# Patient Record
Sex: FEMALE | Race: WHITE | URBAN - METROPOLITAN AREA
[De-identification: names, ages, dates, MRNs, and addresses within clinical notes are randomized per-mention and may not be internally consistent; named-entity substitution may affect disease eponyms.]

---

## 2019-05-23 RX ORDER — LOSARTAN POTASSIUM 25 MG/1
TABLET ORAL
Qty: 30 TABLET | Refills: 0 | OUTPATIENT
Start: 2019-05-23

## 2019-05-28 RX ORDER — LOSARTAN POTASSIUM 25 MG/1
TABLET ORAL
Qty: 90 TABLET | Refills: 1 | Status: SHIPPED | OUTPATIENT
Start: 2019-05-28 | End: 2019-08-20 | Stop reason: SDUPTHER

## 2019-05-28 RX ORDER — LOSARTAN POTASSIUM 25 MG/1
TABLET ORAL
Refills: 0 | COMMUNITY
Start: 2019-04-24 | End: 2019-05-28 | Stop reason: SDUPTHER

## 2019-07-23 RX ORDER — GABAPENTIN 100 MG/1
100 CAPSULE ORAL 2 TIMES DAILY
Qty: 60 CAPSULE | Refills: 2 | Status: SHIPPED | OUTPATIENT
Start: 2019-07-23 | End: 2019-08-20 | Stop reason: SDUPTHER

## 2019-08-20 ENCOUNTER — HOSPITAL ENCOUNTER (OUTPATIENT)
Age: 64
Discharge: HOME | End: 2019-08-22
Payer: COMMERCIAL

## 2019-08-20 ENCOUNTER — OFFICE VISIT (OUTPATIENT)
Dept: PRIMARY CARE CLINIC | Age: 64
End: 2019-08-20
Payer: COMMERCIAL

## 2019-08-20 VITALS
DIASTOLIC BLOOD PRESSURE: 78 MMHG | TEMPERATURE: 97.7 F | OXYGEN SATURATION: 96 % | HEART RATE: 80 BPM | BODY MASS INDEX: 38.71 KG/M2 | SYSTOLIC BLOOD PRESSURE: 136 MMHG | HEIGHT: 61 IN | WEIGHT: 205 LBS

## 2019-08-20 DIAGNOSIS — E55.9 VITAMIN D DEFICIENCY: ICD-10-CM

## 2019-08-20 DIAGNOSIS — C50.919 MALIGNANT NEOPLASM OF FEMALE BREAST, UNSPECIFIED ESTROGEN RECEPTOR STATUS, UNSPECIFIED LATERALITY, UNSPECIFIED SITE OF BREAST (HCC): ICD-10-CM

## 2019-08-20 DIAGNOSIS — E78.2 MIXED HYPERLIPIDEMIA: ICD-10-CM

## 2019-08-20 DIAGNOSIS — I10 ESSENTIAL HYPERTENSION: ICD-10-CM

## 2019-08-20 DIAGNOSIS — R73.01 IMPAIRED FASTING GLUCOSE: ICD-10-CM

## 2019-08-20 DIAGNOSIS — K21.9 GASTROESOPHAGEAL REFLUX DISEASE, ESOPHAGITIS PRESENCE NOT SPECIFIED: ICD-10-CM

## 2019-08-20 DIAGNOSIS — Z12.4 SCREENING FOR CERVICAL CANCER: ICD-10-CM

## 2019-08-20 DIAGNOSIS — M48.061 SPINAL STENOSIS OF LUMBAR REGION, UNSPECIFIED WHETHER NEUROGENIC CLAUDICATION PRESENT: ICD-10-CM

## 2019-08-20 DIAGNOSIS — L30.9 DERMATITIS: ICD-10-CM

## 2019-08-20 DIAGNOSIS — E66.01 CLASS 2 SEVERE OBESITY DUE TO EXCESS CALORIES WITH SERIOUS COMORBIDITY AND BODY MASS INDEX (BMI) OF 39.0 TO 39.9 IN ADULT (HCC): Primary | ICD-10-CM

## 2019-08-20 DIAGNOSIS — E03.8 OTHER SPECIFIED HYPOTHYROIDISM: ICD-10-CM

## 2019-08-20 DIAGNOSIS — M85.80 OSTEOPENIA, UNSPECIFIED LOCATION: ICD-10-CM

## 2019-08-20 PROBLEM — E66.812 CLASS 2 SEVERE OBESITY DUE TO EXCESS CALORIES WITH SERIOUS COMORBIDITY AND BODY MASS INDEX (BMI) OF 39.0 TO 39.9 IN ADULT (HCC): Status: ACTIVE | Noted: 2019-08-20

## 2019-08-20 PROCEDURE — 99214 OFFICE O/P EST MOD 30 MIN: CPT | Performed by: FAMILY MEDICINE

## 2019-08-20 PROCEDURE — G0123 SCREEN CERV/VAG THIN LAYER: HCPCS

## 2019-08-20 RX ORDER — METOPROLOL SUCCINATE 50 MG/1
50 TABLET, EXTENDED RELEASE ORAL DAILY
Qty: 90 TABLET | Refills: 1 | Status: SHIPPED
Start: 2019-08-20 | End: 2020-02-25 | Stop reason: SDUPTHER

## 2019-08-20 RX ORDER — LEVOTHYROXINE SODIUM 0.03 MG/1
25 TABLET ORAL DAILY
Qty: 90 TABLET | Refills: 1 | Status: SHIPPED | OUTPATIENT
Start: 2019-08-20 | End: 2019-11-01

## 2019-08-20 RX ORDER — CLOBETASOL PROPIONATE 0.5 MG/G
OINTMENT TOPICAL
Qty: 45 G | Refills: 2 | Status: SHIPPED | OUTPATIENT
Start: 2019-08-20

## 2019-08-20 RX ORDER — GABAPENTIN 100 MG/1
100 CAPSULE ORAL 2 TIMES DAILY
Qty: 90 CAPSULE | Refills: 1 | Status: SHIPPED | OUTPATIENT
Start: 2019-08-20 | End: 2020-01-31 | Stop reason: SDUPTHER

## 2019-08-20 RX ORDER — OMEPRAZOLE 20 MG/1
20 CAPSULE, DELAYED RELEASE ORAL DAILY
Qty: 90 CAPSULE | Refills: 1 | Status: SHIPPED
Start: 2019-08-20 | End: 2020-02-25 | Stop reason: SDUPTHER

## 2019-08-20 RX ORDER — LOSARTAN POTASSIUM 25 MG/1
TABLET ORAL
Qty: 90 TABLET | Refills: 1 | Status: SHIPPED | OUTPATIENT
Start: 2019-08-20

## 2019-08-20 RX ORDER — LEVOTHYROXINE SODIUM 0.03 MG/1
25 TABLET ORAL
COMMUNITY
Start: 2016-08-30 | End: 2019-08-20 | Stop reason: SDUPTHER

## 2019-08-20 ASSESSMENT — ENCOUNTER SYMPTOMS
SORE THROAT: 0
ABDOMINAL PAIN: 0
EYE REDNESS: 0
NAUSEA: 0
CONSTIPATION: 0
COLOR CHANGE: 0
SINUS PAIN: 0
CHEST TIGHTNESS: 0
ABDOMINAL DISTENTION: 0
PHOTOPHOBIA: 0
DIARRHEA: 0
BLOOD IN STOOL: 0
EYE ITCHING: 0
COUGH: 0
WHEEZING: 0
VOMITING: 0
SHORTNESS OF BREATH: 0
SINUS PRESSURE: 0
EYE PAIN: 0
BACK PAIN: 1

## 2019-08-20 ASSESSMENT — PATIENT HEALTH QUESTIONNAIRE - PHQ9
SUM OF ALL RESPONSES TO PHQ9 QUESTIONS 1 & 2: 0
SUM OF ALL RESPONSES TO PHQ QUESTIONS 1-9: 0
SUM OF ALL RESPONSES TO PHQ QUESTIONS 1-9: 0
1. LITTLE INTEREST OR PLEASURE IN DOING THINGS: 0
2. FEELING DOWN, DEPRESSED OR HOPELESS: 0

## 2019-09-19 PROBLEM — Z12.4 SCREENING FOR CERVICAL CANCER: Status: RESOLVED | Noted: 2019-08-20 | Resolved: 2019-09-19

## 2019-10-31 ENCOUNTER — HOSPITAL ENCOUNTER (OUTPATIENT)
Age: 64
Discharge: HOME | End: 2019-11-02
Payer: COMMERCIAL

## 2019-10-31 DIAGNOSIS — E78.2 MIXED HYPERLIPIDEMIA: ICD-10-CM

## 2019-10-31 DIAGNOSIS — R73.01 IMPAIRED FASTING GLUCOSE: ICD-10-CM

## 2019-10-31 DIAGNOSIS — E55.9 VITAMIN D DEFICIENCY: ICD-10-CM

## 2019-10-31 DIAGNOSIS — I10 ESSENTIAL HYPERTENSION: ICD-10-CM

## 2019-10-31 DIAGNOSIS — E03.8 OTHER SPECIFIED HYPOTHYROIDISM: ICD-10-CM

## 2019-10-31 LAB
ALBUMIN SERPL-MCNC: 4.5 G/DL (ref 3.5–5.2)
ALP BLD-CCNC: 89 U/L (ref 35–104)
ALT SERPL-CCNC: 21 U/L (ref 0–32)
ANION GAP SERPL CALCULATED.3IONS-SCNC: 15 MMOL/L (ref 7–16)
AST SERPL-CCNC: 24 U/L (ref 0–31)
BASOPHILS ABSOLUTE: 0.02 E9/L (ref 0–0.2)
BASOPHILS RELATIVE PERCENT: 0.5 % (ref 0–2)
BILIRUB SERPL-MCNC: 0.7 MG/DL (ref 0–1.2)
BILIRUBIN URINE: NEGATIVE
BLOOD, URINE: NEGATIVE
BUN BLDV-MCNC: 11 MG/DL (ref 8–23)
CALCIUM SERPL-MCNC: 9.3 MG/DL (ref 8.6–10.2)
CHLORIDE BLD-SCNC: 101 MMOL/L (ref 98–107)
CHOLESTEROL, TOTAL: 205 MG/DL (ref 0–199)
CLARITY: CLEAR
CO2: 25 MMOL/L (ref 22–29)
COLOR: YELLOW
CREAT SERPL-MCNC: 0.7 MG/DL (ref 0.5–1)
CREATININE URINE: 144 MG/DL (ref 29–226)
EOSINOPHILS ABSOLUTE: 0.03 E9/L (ref 0.05–0.5)
EOSINOPHILS RELATIVE PERCENT: 0.7 % (ref 0–6)
GFR AFRICAN AMERICAN: >60
GFR NON-AFRICAN AMERICAN: >60 ML/MIN/1.73
GLUCOSE BLD-MCNC: 108 MG/DL (ref 74–99)
GLUCOSE URINE: NEGATIVE MG/DL
HBA1C MFR BLD: 6.6 % (ref 4–5.6)
HCT VFR BLD CALC: 41.9 % (ref 34–48)
HDLC SERPL-MCNC: 56 MG/DL
HEMOGLOBIN: 12.8 G/DL (ref 11.5–15.5)
IMMATURE GRANULOCYTES #: 0.03 E9/L
IMMATURE GRANULOCYTES %: 0.7 % (ref 0–5)
KETONES, URINE: NEGATIVE MG/DL
LDL CHOLESTEROL CALCULATED: 133 MG/DL (ref 0–99)
LEUKOCYTE ESTERASE, URINE: NEGATIVE
LYMPHOCYTES ABSOLUTE: 1.46 E9/L (ref 1.5–4)
LYMPHOCYTES RELATIVE PERCENT: 35.4 % (ref 20–42)
MCH RBC QN AUTO: 29.5 PG (ref 26–35)
MCHC RBC AUTO-ENTMCNC: 30.5 % (ref 32–34.5)
MCV RBC AUTO: 96.5 FL (ref 80–99.9)
MICROALBUMIN UR-MCNC: 41.1 MG/L
MICROALBUMIN/CREAT UR-RTO: 28.5 (ref 0–30)
MONOCYTES ABSOLUTE: 0.52 E9/L (ref 0.1–0.95)
MONOCYTES RELATIVE PERCENT: 12.6 % (ref 2–12)
NEUTROPHILS ABSOLUTE: 2.07 E9/L (ref 1.8–7.3)
NEUTROPHILS RELATIVE PERCENT: 50.1 % (ref 43–80)
NITRITE, URINE: NEGATIVE
PDW BLD-RTO: 15.2 FL (ref 11.5–15)
PH UA: 7 (ref 5–9)
PLATELET # BLD: 230 E9/L (ref 130–450)
PMV BLD AUTO: 11.3 FL (ref 7–12)
POTASSIUM SERPL-SCNC: 4.1 MMOL/L (ref 3.5–5)
PROTEIN UA: NEGATIVE MG/DL
RBC # BLD: 4.34 E12/L (ref 3.5–5.5)
SODIUM BLD-SCNC: 141 MMOL/L (ref 132–146)
SPECIFIC GRAVITY UA: 1.01 (ref 1–1.03)
T4 FREE: 1.22 NG/DL (ref 0.93–1.7)
TOTAL PROTEIN: 8 G/DL (ref 6.4–8.3)
TRIGL SERPL-MCNC: 82 MG/DL (ref 0–149)
TSH SERPL DL<=0.05 MIU/L-ACNC: 4.58 UIU/ML (ref 0.27–4.2)
UROBILINOGEN, URINE: 0.2 E.U./DL
VITAMIN D 25-HYDROXY: 24 NG/ML (ref 30–100)
VLDLC SERPL CALC-MCNC: 16 MG/DL
WBC # BLD: 4.1 E9/L (ref 4.5–11.5)

## 2019-10-31 PROCEDURE — 85025 COMPLETE CBC W/AUTO DIFF WBC: CPT

## 2019-10-31 PROCEDURE — 82044 UR ALBUMIN SEMIQUANTITATIVE: CPT

## 2019-10-31 PROCEDURE — 80061 LIPID PANEL: CPT

## 2019-10-31 PROCEDURE — 81003 URINALYSIS AUTO W/O SCOPE: CPT

## 2019-10-31 PROCEDURE — 83036 HEMOGLOBIN GLYCOSYLATED A1C: CPT

## 2019-10-31 PROCEDURE — 84439 ASSAY OF FREE THYROXINE: CPT

## 2019-10-31 PROCEDURE — 36415 COLL VENOUS BLD VENIPUNCTURE: CPT

## 2019-10-31 PROCEDURE — 82306 VITAMIN D 25 HYDROXY: CPT

## 2019-10-31 PROCEDURE — 84443 ASSAY THYROID STIM HORMONE: CPT

## 2019-10-31 PROCEDURE — 80053 COMPREHEN METABOLIC PANEL: CPT

## 2019-10-31 PROCEDURE — 82570 ASSAY OF URINE CREATININE: CPT

## 2019-11-01 DIAGNOSIS — E03.8 OTHER SPECIFIED HYPOTHYROIDISM: ICD-10-CM

## 2019-11-01 RX ORDER — LEVOTHYROXINE SODIUM 0.05 MG/1
25 TABLET ORAL DAILY
Qty: 30 TABLET | Refills: 5 | Status: SHIPPED | OUTPATIENT
Start: 2019-11-01 | End: 2020-01-23 | Stop reason: SDUPTHER

## 2020-01-17 ENCOUNTER — HOSPITAL ENCOUNTER (OUTPATIENT)
Age: 65
Discharge: HOME | End: 2020-01-19
Payer: COMMERCIAL

## 2020-01-17 LAB
T4 FREE: 1.26 NG/DL (ref 0.93–1.7)
TSH SERPL DL<=0.05 MIU/L-ACNC: 3.29 UIU/ML (ref 0.27–4.2)

## 2020-01-17 PROCEDURE — 36415 COLL VENOUS BLD VENIPUNCTURE: CPT

## 2020-01-17 PROCEDURE — 84439 ASSAY OF FREE THYROXINE: CPT

## 2020-01-17 PROCEDURE — 84443 ASSAY THYROID STIM HORMONE: CPT

## 2020-01-23 ENCOUNTER — TELEPHONE (OUTPATIENT)
Dept: PRIMARY CARE CLINIC | Age: 65
End: 2020-01-23

## 2020-01-23 RX ORDER — LEVOTHYROXINE SODIUM 0.05 MG/1
50 TABLET ORAL DAILY
Qty: 30 TABLET | Refills: 5 | Status: SHIPPED
Start: 2020-01-23 | End: 2020-02-25 | Stop reason: SDUPTHER

## 2020-01-31 RX ORDER — GABAPENTIN 100 MG/1
100 CAPSULE ORAL 2 TIMES DAILY
Qty: 60 CAPSULE | Refills: 2 | Status: SHIPPED
Start: 2020-01-31 | End: 2020-02-25 | Stop reason: SDUPTHER

## 2020-01-31 NOTE — TELEPHONE ENCOUNTER
Last Appointment:  8/20/2019  Future Appointments   Date Time Provider Mandi Torres   2/25/2020  1:00 PM Tenisha Benton  [facility]

## 2020-02-25 ENCOUNTER — OFFICE VISIT (OUTPATIENT)
Dept: PRIMARY CARE CLINIC | Age: 65
End: 2020-02-25
Payer: COMMERCIAL

## 2020-02-25 VITALS
TEMPERATURE: 97 F | DIASTOLIC BLOOD PRESSURE: 82 MMHG | RESPIRATION RATE: 18 BRPM | HEIGHT: 61 IN | HEART RATE: 87 BPM | SYSTOLIC BLOOD PRESSURE: 126 MMHG | WEIGHT: 209 LBS | OXYGEN SATURATION: 96 % | BODY MASS INDEX: 39.46 KG/M2

## 2020-02-25 PROBLEM — M79.675 GREAT TOE PAIN, LEFT: Status: ACTIVE | Noted: 2020-02-25

## 2020-02-25 PROBLEM — E66.01 CLASS 3 SEVERE OBESITY DUE TO EXCESS CALORIES WITH SERIOUS COMORBIDITY AND BODY MASS INDEX (BMI) OF 40.0 TO 44.9 IN ADULT (HCC): Status: ACTIVE | Noted: 2020-02-25

## 2020-02-25 PROBLEM — E66.813 CLASS 3 SEVERE OBESITY DUE TO EXCESS CALORIES WITH SERIOUS COMORBIDITY AND BODY MASS INDEX (BMI) OF 40.0 TO 44.9 IN ADULT (HCC): Status: ACTIVE | Noted: 2020-02-25

## 2020-02-25 PROBLEM — Z12.31 ENCOUNTER FOR SCREENING MAMMOGRAM FOR BREAST CANCER: Status: ACTIVE | Noted: 2020-02-25

## 2020-02-25 PROCEDURE — 99214 OFFICE O/P EST MOD 30 MIN: CPT | Performed by: FAMILY MEDICINE

## 2020-02-25 RX ORDER — LEVOTHYROXINE SODIUM 0.05 MG/1
50 TABLET ORAL DAILY
Qty: 90 TABLET | Refills: 3 | Status: SHIPPED | OUTPATIENT
Start: 2020-02-25 | End: 2020-05-25

## 2020-02-25 RX ORDER — GABAPENTIN 100 MG/1
100 CAPSULE ORAL 2 TIMES DAILY
Qty: 180 CAPSULE | Refills: 1 | Status: SHIPPED | OUTPATIENT
Start: 2020-02-25 | End: 2020-05-25

## 2020-02-25 RX ORDER — METOPROLOL SUCCINATE 50 MG/1
50 TABLET, EXTENDED RELEASE ORAL DAILY
Qty: 90 TABLET | Refills: 3 | Status: SHIPPED | OUTPATIENT
Start: 2020-02-25

## 2020-02-25 RX ORDER — OMEPRAZOLE 20 MG/1
20 CAPSULE, DELAYED RELEASE ORAL DAILY
Qty: 90 CAPSULE | Refills: 3 | Status: SHIPPED | OUTPATIENT
Start: 2020-02-25

## 2020-02-25 ASSESSMENT — ENCOUNTER SYMPTOMS
EYE PAIN: 0
PHOTOPHOBIA: 0
BLOOD IN STOOL: 0
EYE ITCHING: 0
SINUS PRESSURE: 0
EYE REDNESS: 0
ABDOMINAL DISTENTION: 0
DIARRHEA: 0
VOMITING: 0
COLOR CHANGE: 0
CONSTIPATION: 0
SINUS PAIN: 0
ABDOMINAL PAIN: 0
SHORTNESS OF BREATH: 0
BACK PAIN: 1
SORE THROAT: 0
WHEEZING: 0
COUGH: 0
NAUSEA: 0
CHEST TIGHTNESS: 0

## 2020-02-25 ASSESSMENT — PATIENT HEALTH QUESTIONNAIRE - PHQ9
1. LITTLE INTEREST OR PLEASURE IN DOING THINGS: 0
SUM OF ALL RESPONSES TO PHQ9 QUESTIONS 1 & 2: 0
SUM OF ALL RESPONSES TO PHQ QUESTIONS 1-9: 0
2. FEELING DOWN, DEPRESSED OR HOPELESS: 0
SUM OF ALL RESPONSES TO PHQ QUESTIONS 1-9: 0

## 2020-02-25 NOTE — PROGRESS NOTES
[facility]) Physicians   Internal Medicine     2020  Radha Aguirre : 1955 Sex: female  Age:64 y.o. Chief Complaint   Patient presents with    Hypertension     6 month visit and med refill        Hypertension   Pertinent negatives include no chest pain, headaches, neck pain, palpitations or shortness of breath. Patient presents to office for check up. States hypothyroid. On levothyroxine. No reported side effects, reviewed thyroid bw from [address]12--jsafqjp normal  - States h/o htn--bp well controlled. States does not check blood pressure at home.   - States has GERD. On omeprazole. Stable. - States has low back pain and spinal stenosis. States intermittently takes gabapentin. Has seen  specialist whom suggested surgery - declined  - States h/o breast cancer. Dx was . States had mastectomy. States had chemo and radiation and 5  years of Arimidex. Following with oncology. Due for mammogram 2020  - States has impaired fasting sugar. States watching diet. discussed need to lose weight, recommended referral to dietician  - States has dry flaky skin chin  Reviewed bw results--thyroid bw wnl, was underactive at time of bw 10/2019--synthroid dose was adjusted. Reviewed dexa scan from 2019--osteopenia of hip --1.9, spine normal, recommend calcium 1000 mg per day and vit d3 1000 iu per day and weight bearing exercises. Patient with c/o left great toe pain for past month, denies known injury although does state her 2nd toe was bruised. Denies change in shoes  Review of Systems   Constitutional: Negative for appetite change, fatigue, fever and unexpected weight change. HENT: Negative for congestion, ear discharge, ear pain, hearing loss, mouth sores, postnasal drip, sinus pressure, sinus pain, sneezing and sore throat. Eyes: Negative for photophobia, pain, redness and itching. Respiratory: Negative for cough, chest tightness, shortness of breath and wheezing. file     Physically abused: Not on file     Forced sexual activity: Not on file   Other Topics Concern    Not on file   Social History Narrative    Not on file       Vitals:    02/25/20 1308   BP: 126/82   Pulse: 87   Resp: 18   Temp: 97 °F (36.1 °C)   TempSrc: Temporal   SpO2: 96%   Weight: 209 lb (94.8 kg)   Height: 5' 0.5\" (1.537 m)       Exam:  Physical Exam  Vitals signs reviewed. Constitutional:       Appearance: She is well-developed. Comments: Obesity noted   HENT:      Head: Normocephalic and atraumatic. Right Ear: External ear normal.      Left Ear: External ear normal.      Nose: Nose normal.   Eyes:      Conjunctiva/sclera: Conjunctivae normal.      Pupils: Pupils are equal, round, and reactive to light. Neck:      Musculoskeletal: Normal range of motion and neck supple. Cardiovascular:      Rate and Rhythm: Normal rate and regular rhythm. Heart sounds: Normal heart sounds. Pulmonary:      Effort: Pulmonary effort is normal.   Abdominal:      General: Bowel sounds are normal.      Palpations: Abdomen is soft. Musculoskeletal:      Comments: ttp over base of great left toe, no ttp over metatarsal bones   Skin:     General: Skin is warm and dry. Findings: No rash. Comments: Dry skin noted on chin   Neurological:      Mental Status: She is alert and oriented to person, place, and time.    Psychiatric:         Behavior: Behavior normal.         Assessment and Plan:    Problem List        Circulatory    Hypertension    Relevant Medications    losartan (COZAAR) 25 MG tablet    metoprolol succinate (TOPROL XL) 50 MG extended release tablet    Other Relevant Orders    CBC Auto Differential       Digestive    GERD (gastroesophageal reflux disease)    Relevant Medications    omeprazole (PRILOSEC) 20 MG delayed release capsule       Endocrine    Hypothyroidism    Relevant Medications    levothyroxine (SYNTHROID) 50 MCG tablet    Other Relevant Orders    CBC Auto Differential

## 2020-03-21 ENCOUNTER — TELEPHONE (OUTPATIENT)
Dept: PODIATRY | Age: 65
End: 2020-03-21

## 2020-03-26 PROBLEM — Z12.31 ENCOUNTER FOR SCREENING MAMMOGRAM FOR BREAST CANCER: Status: RESOLVED | Noted: 2020-02-25 | Resolved: 2020-03-26

## 2020-11-03 PROBLEM — I10 HYPERTENSION: Status: RESOLVED | Noted: 2020-11-03 | Resolved: 2020-11-03

## 2021-01-01 NOTE — TELEPHONE ENCOUNTER
Due to [id number] podiatry is not seeing patients in [place] [address] office. Patient was notified that her appt for 3-24 would be cancelled via voicemail and she needs to call in to r/s the appt for the [facility] office. Dr. Adrián Pritchard is only seeing patients in [facility] on Mondays. Appointment will need to be rescheduled for a Monday.     If she does not want to be seen in [facility], her appt for [place] can be rescheduled for 6/2/2020 or any Tuesday after that Yes

## 2023-04-05 ENCOUNTER — TELEPHONE (OUTPATIENT)
Dept: CARDIAC CATH/INVASIVE PROCEDURES | Age: 68
End: 2023-04-05

## 2023-04-06 ENCOUNTER — HOSPITAL ENCOUNTER (OUTPATIENT)
Dept: CARDIAC CATH/INVASIVE PROCEDURES | Age: 68
Discharge: HOME | End: 2023-04-06
Payer: MEDICARE

## 2023-04-06 VITALS
DIASTOLIC BLOOD PRESSURE: 98 MMHG | HEIGHT: 61 IN | WEIGHT: 196 LBS | OXYGEN SATURATION: 96 % | TEMPERATURE: 96.6 F | BODY MASS INDEX: 37 KG/M2 | RESPIRATION RATE: 18 BRPM | SYSTOLIC BLOOD PRESSURE: 185 MMHG | HEART RATE: 87 BPM

## 2023-04-06 PROBLEM — I50.22 CHRONIC SYSTOLIC HEART FAILURE (HCC): Status: ACTIVE | Noted: 2023-04-06

## 2023-04-06 LAB
ABO + RH BLD: NORMAL
BLD GP AB SCN SERPL QL: NORMAL

## 2023-04-06 PROCEDURE — 2709999900 HC NON-CHARGEABLE SUPPLY

## 2023-04-06 PROCEDURE — 2580000003 HC RX 258: Performed by: INTERNAL MEDICINE

## 2023-04-06 PROCEDURE — 2500000003 HC RX 250 WO HCPCS

## 2023-04-06 PROCEDURE — 93458 L HRT ARTERY/VENTRICLE ANGIO: CPT | Performed by: INTERNAL MEDICINE

## 2023-04-06 PROCEDURE — 86850 RBC ANTIBODY SCREEN: CPT

## 2023-04-06 PROCEDURE — 86900 BLOOD TYPING SEROLOGIC ABO: CPT

## 2023-04-06 PROCEDURE — C1894 INTRO/SHEATH, NON-LASER: HCPCS

## 2023-04-06 PROCEDURE — 36415 COLL VENOUS BLD VENIPUNCTURE: CPT

## 2023-04-06 PROCEDURE — 93458 L HRT ARTERY/VENTRICLE ANGIO: CPT

## 2023-04-06 PROCEDURE — 86901 BLOOD TYPING SEROLOGIC RH(D): CPT

## 2023-04-06 PROCEDURE — 6360000002 HC RX W HCPCS

## 2023-04-06 PROCEDURE — C1769 GUIDE WIRE: HCPCS

## 2023-04-06 RX ORDER — SODIUM CHLORIDE 9 MG/ML
INJECTION, SOLUTION INTRAVENOUS CONTINUOUS
Status: DISCONTINUED | OUTPATIENT
Start: 2023-04-06 | End: 2023-04-07 | Stop reason: HOSPADM

## 2023-04-06 RX ORDER — VALSARTAN 40 MG/1
40 TABLET ORAL DAILY
COMMUNITY

## 2023-04-06 RX ADMIN — SODIUM CHLORIDE: 9 INJECTION, SOLUTION INTRAVENOUS at 08:12

## 2023-04-06 RX ADMIN — SODIUM CHLORIDE: 9 INJECTION, SOLUTION INTRAVENOUS at 08:11

## 2023-04-06 NOTE — PROGRESS NOTES
Chart reviewed. Patient with     Patient Active Problem List   Diagnosis    Lumbar stenosis    Hypothyroidism    Hyperlipidemia    GERD (gastroesophageal reflux disease)    Breast cancer in female [facility])    Lumbar spinal stenosis    Impaired fasting glucose    Vitamin D deficiency    Osteopenia    Dermatitis    Class 3 severe obesity due to excess calories with serious comorbidity and body mass index (BMI) of 40.0 to 44.9 in adult [facility])    Essential hypertension    Great toe pain, left    Chronic systolic heart failure ([facility] [address]        Jr [address] appointment made on 4/11/23. My attending, Dr. Elena Marquez, will see at that time to discuss possible surgical intervention.       Marjorie Hastings, APRN - CNP

## 2023-04-06 NOTE — DISCHARGE INSTRUCTIONS
Future Appointments   Date Time Provider Mandi Torres   4/11/2023  2:15 PM Juan Andrade,  CARDIO SURG [facility]     POST-CATH  RADIAL DISCHARGE INSTRUCTIONS:    Please follow instructions closely for prevention of complications. INSTRUCTIONS FOR CARE OF CATHETERIZATION SITE: RADIAL (WRIST) APPROACH:    DO NOT BEND wrist for 48 hours  DO NOT PUSH with affected wrist for 48 hrs. DO NOT submerge wrist in water for 3 days  NO blood pressure, IV or blood work in affected arm for 3- 5 days    KEEP SPLINT (ARMBOARD) ON UNTIL TOMORROW MORNING  Remove dressing from wrist tomorrow morning. Gently cleanse, pat dry, apply band aid for 24 hours. Call your physician if you notice:      *Increase in pain at catheterization site      *Increase in swelling at catheterization site      *Redness or drainage at the catheterization site      *Numbness, tingling or cramping in the catheterization arm at rest or with activity    CALL 911 if you have active bleeding from your catheterization site. DO NOT DRIVE YOURSELF TO THE HOSPITAL    Drink 3-4 extra glasses of water today    No driving, or working for 48 hrs    Continue low fat diet.

## 2023-04-06 NOTE — PROCEDURES
the left circumflex  showing no significant disease. d.  RCA, a large dominant vessel with 90% proximal/mid vessel stenosis. The distal RCA provided collaterals to the LAD. 2.  Dilated left ventricle with severe generalized hypokinesis with an  estimated ejection fraction of 25% to 30%. 3.  Mitral regurgitation, the severity of which is not well quantitated.         Connie Weaver MD    D: 04/06/2023 9:46:05       T: 04/06/2023 9:48:36     [email or web address]  Job#: [id number]     Doc#: [id number]    CC:

## 2023-08-22 ENCOUNTER — TELEPHONE (OUTPATIENT)
Dept: PODIATRY | Age: 68
End: 2023-08-22

## 2023-08-22 NOTE — TELEPHONE ENCOUNTER
Referral received from patients PCP Nazanin Whipple for fracture phalanx and hammertoe left foot. Left msg for patient to call our direct line to sched apt.

## 2025-07-31 LAB
ANION GAP SERPL CALCULATED.3IONS-SCNC: 10 MEQ/L (ref 4–14)
BASOPHILS ABSOLUTE: 0 K/UL (ref 0–0.2)
BASOPHILS RELATIVE PERCENT: 0.5 % (ref 0–1.5)
BUN BLDV-MCNC: 15 MG/DL (ref 7–25)
CALCIUM SERPL-MCNC: 10.5 MG/DL (ref 8.5–10.5)
CHLORIDE BLD-SCNC: 99 MEQ/L (ref 98–107)
CO2: 27 MEQ/L (ref 21–31)
CREAT SERPL-MCNC: 0.92 MG/DL (ref 0.6–1.2)
CREATININE + EGFR PANEL: 73 ML/MIN
CREATININE URINE: 71.38 MG/DL
EOSINOPHILS ABSOLUTE: 0 K/UL (ref 0–0.33)
EOSINOPHILS RELATIVE PERCENT: 0.4 % (ref 0–3)
GFR NON-AFRICAN AMERICAN: 61 ML/MIN
GLUCOSE BLD-MCNC: 136 MG/DL (ref 70–99)
HCT VFR BLD CALC: 40.6 % (ref 36–44)
HEMOGLOBIN: 14.1 G/DL (ref 12–15)
LYMPHOCYTES ABSOLUTE: 1.4 K/UL (ref 1.1–4.8)
LYMPHOCYTES RELATIVE PERCENT: 24.2 % (ref 24–44)
MAGNESIUM: 1.8 MEQ/L (ref 1.6–2.6)
MCH RBC QN AUTO: 32.4 PG (ref 28–34)
MCHC RBC AUTO-ENTMCNC: 34.8 G/DL (ref 33–37)
MCV RBC AUTO: 93.1 FL (ref 80–100)
MONOCYTES ABSOLUTE: 0.8 K/UL (ref 0.2–0.7)
MONOCYTES RELATIVE PERCENT: 15 % (ref 3.4–9)
NEUTROPHILS ABSOLUTE: 3.4 K/UL (ref 1.83–8.7)
NEUTROPHILS RELATIVE PERCENT: 59.9 % (ref 40–74)
PDW BLD-RTO: 14.6 % (ref 10.9–14.3)
PHOSPHORUS: 2.9 MG/DL (ref 2.5–4.6)
PLATELET # BLD: 176 K/UL (ref 150–450)
PMV BLD AUTO: 9.3 FL (ref 7.4–10.4)
POTASSIUM SERPL-SCNC: 4.7 MEQ/L (ref 3.6–5)
PROTEIN, URINE, RANDOM: 100 MG/DL (ref 0–9)
PROTEIN/CREAT RATIO URINE RAN: 1401 MG/G (ref 0–199.9)
RBC # BLD: 4.36 M/UL (ref 4–4.9)
SODIUM BLD-SCNC: 136 MEQ/L (ref 135–145)
URATE: 3.2 MG/DL (ref 2.3–6.6)
WBC # BLD: 5.6 K/UL (ref 4.5–11)